# Patient Record
Sex: MALE | Race: OTHER
[De-identification: names, ages, dates, MRNs, and addresses within clinical notes are randomized per-mention and may not be internally consistent; named-entity substitution may affect disease eponyms.]

---

## 2024-04-24 ENCOUNTER — APPOINTMENT (OUTPATIENT)
Dept: VASCULAR SURGERY | Facility: CLINIC | Age: 44
End: 2024-04-24

## 2024-04-24 VITALS
RESPIRATION RATE: 16 BRPM | HEIGHT: 68.5 IN | TEMPERATURE: 98.9 F | HEART RATE: 76 BPM | BODY MASS INDEX: 28.17 KG/M2 | WEIGHT: 188 LBS | DIASTOLIC BLOOD PRESSURE: 89 MMHG | SYSTOLIC BLOOD PRESSURE: 148 MMHG | OXYGEN SATURATION: 97 %

## 2024-04-24 DIAGNOSIS — Z78.9 OTHER SPECIFIED HEALTH STATUS: ICD-10-CM

## 2024-04-24 PROBLEM — Z00.00 ENCOUNTER FOR PREVENTIVE HEALTH EXAMINATION: Status: ACTIVE | Noted: 2024-04-24

## 2024-04-24 PROCEDURE — 99203 OFFICE O/P NEW LOW 30 MIN: CPT

## 2024-04-24 RX ORDER — MULTIVITAMIN
TABLET ORAL
Refills: 0 | Status: ACTIVE | COMMUNITY

## 2024-05-14 NOTE — PHYSICAL EXAM
[Normal Rate and Rhythm] : normal rate and rhythm [2+] : left 2+ [No Rash or Lesion] : No rash or lesion [Alert] : alert [Oriented to Person] : oriented to person [Oriented to Place] : oriented to place [Oriented to Time] : oriented to time [Calm] : calm [Abdomen Tenderness] : ~T ~M No abdominal tenderness [Skin Ulcer] : no ulcer [de-identified] : Appears well, in no acute distress. [de-identified] : normocephalic, atraumatic [de-identified] :  supple, no masses. [de-identified] :   normal respiratory effort. unlabored breathing. [de-identified] : Moves all extremities

## 2024-05-14 NOTE — HISTORY OF PRESENT ILLNESS
[FreeTextEntry1] : 42 yo male with no known significant medical history presents with complaints of discoloration of the fingers of his right hand when exposed to the cold. Patient is a  and notes during the colder weather his fingers predominantly on the right hand become cold and. He denies trauma or history of rheumatic disease. Denies fever, chills, pain, weakness, swelling, numbness or paresthesia's.

## 2024-05-14 NOTE — ASSESSMENT
[FreeTextEntry1] : 42 yo male with evidence of raynaud's phenomenon. Patient with palpable pulses, no ulcers and is asymptomatic apart from color changes. Recommend wearing thick gloves and using hand warmers in cold weather. Patient was reassured. Will consider calcium channel blockers if symptoms develop. Follow up as needed.

## 2024-12-03 ENCOUNTER — APPOINTMENT (OUTPATIENT)
Dept: VASCULAR SURGERY | Facility: CLINIC | Age: 44
End: 2024-12-03